# Patient Record
Sex: FEMALE | Race: WHITE | NOT HISPANIC OR LATINO | Employment: OTHER | URBAN - METROPOLITAN AREA
[De-identification: names, ages, dates, MRNs, and addresses within clinical notes are randomized per-mention and may not be internally consistent; named-entity substitution may affect disease eponyms.]

---

## 2017-03-13 RX ORDER — TIZANIDINE 4 MG/1
TABLET ORAL
Qty: 90 TABLET | Refills: 2 | Status: SHIPPED | OUTPATIENT
Start: 2017-03-13 | End: 2017-06-04 | Stop reason: SDUPTHER

## 2017-05-11 ENCOUNTER — PATIENT MESSAGE (OUTPATIENT)
Dept: INTERNAL MEDICINE | Facility: CLINIC | Age: 67
End: 2017-05-11

## 2017-05-11 RX ORDER — ESTERIFIED ESTROGEN AND METHYLTESTOSTERONE .625; 1.25 MG/1; MG/1
1 TABLET ORAL DAILY
Qty: 30 TABLET | Refills: 2 | Status: SHIPPED | OUTPATIENT
Start: 2017-05-11 | End: 2017-08-04 | Stop reason: SDUPTHER

## 2017-05-11 RX ORDER — CLONAZEPAM 2 MG/1
2 TABLET ORAL DAILY PRN
Qty: 30 TABLET | Refills: 2 | Status: SHIPPED | OUTPATIENT
Start: 2017-05-11 | End: 2017-08-04 | Stop reason: SDUPTHER

## 2017-05-11 RX ORDER — CLONAZEPAM 2 MG/1
TABLET ORAL
Qty: 30 TABLET | OUTPATIENT
Start: 2017-05-11

## 2017-05-11 RX ORDER — ESTERIFIED ESTROGEN AND METHYLTESTOSTERONE .625; 1.25 MG/1; MG/1
TABLET ORAL
Qty: 30 TABLET | OUTPATIENT
Start: 2017-05-11

## 2017-05-11 NOTE — TELEPHONE ENCOUNTER
Called 3 refills into recorder at [facility].  Patient advised by email.  That will get her to next appt due.    Please sign to chart.    Thanks macario

## 2017-06-04 RX ORDER — TIZANIDINE 4 MG/1
TABLET ORAL
Qty: 90 TABLET | Refills: 2 | Status: SHIPPED | OUTPATIENT
Start: 2017-06-04 | End: 2017-08-27 | Stop reason: SDUPTHER

## 2017-06-26 RX ORDER — AMLODIPINE BESYLATE 5 MG/1
TABLET ORAL
Qty: 30 TABLET | Refills: 8 | Status: SHIPPED | OUTPATIENT
Start: 2017-06-26 | End: 2018-03-02 | Stop reason: SDUPTHER

## 2017-06-26 RX ORDER — LISINOPRIL AND HYDROCHLOROTHIAZIDE 12.5; 2 MG/1; MG/1
TABLET ORAL
Qty: 30 TABLET | Refills: 8 | Status: SHIPPED | OUTPATIENT
Start: 2017-06-26 | End: 2018-01-14 | Stop reason: SDUPTHER

## 2017-07-11 ENCOUNTER — OFFICE VISIT (OUTPATIENT)
Dept: INTERNAL MEDICINE | Facility: CLINIC | Age: 67
End: 2017-07-11
Payer: COMMERCIAL

## 2017-07-11 ENCOUNTER — LAB VISIT (OUTPATIENT)
Dept: LAB | Facility: HOSPITAL | Age: 67
End: 2017-07-11
Attending: INTERNAL MEDICINE
Payer: COMMERCIAL

## 2017-07-11 VITALS
RESPIRATION RATE: 16 BRPM | WEIGHT: 167.56 LBS | HEART RATE: 68 BPM | SYSTOLIC BLOOD PRESSURE: 120 MMHG | HEIGHT: 64 IN | DIASTOLIC BLOOD PRESSURE: 72 MMHG | TEMPERATURE: 98 F | BODY MASS INDEX: 28.6 KG/M2

## 2017-07-11 DIAGNOSIS — G47.00 INSOMNIA, UNSPECIFIED TYPE: ICD-10-CM

## 2017-07-11 DIAGNOSIS — Z12.39 SCREENING FOR MALIGNANT NEOPLASM OF BREAST: ICD-10-CM

## 2017-07-11 DIAGNOSIS — I70.0 AORTIC ATHEROSCLEROSIS: ICD-10-CM

## 2017-07-11 DIAGNOSIS — Z00.00 ANNUAL PHYSICAL EXAM: Primary | ICD-10-CM

## 2017-07-11 DIAGNOSIS — I77.9 CAROTID DISEASE, BILATERAL: ICD-10-CM

## 2017-07-11 DIAGNOSIS — E78.2 MIXED HYPERLIPIDEMIA: ICD-10-CM

## 2017-07-11 DIAGNOSIS — Z00.00 ANNUAL PHYSICAL EXAM: ICD-10-CM

## 2017-07-11 DIAGNOSIS — I10 ESSENTIAL HYPERTENSION: ICD-10-CM

## 2017-07-11 DIAGNOSIS — R07.2 PRECORDIAL PAIN: ICD-10-CM

## 2017-07-11 LAB
ALBUMIN SERPL BCP-MCNC: 4.1 G/DL
ALP SERPL-CCNC: 39 U/L
ALT SERPL W/O P-5'-P-CCNC: 25 U/L
ANION GAP SERPL CALC-SCNC: 12 MMOL/L
AST SERPL-CCNC: 23 U/L
BASOPHILS # BLD AUTO: 0.06 K/UL
BASOPHILS NFR BLD: 0.8 %
BILIRUB SERPL-MCNC: 0.6 MG/DL
BUN SERPL-MCNC: 22 MG/DL
CALCIUM SERPL-MCNC: 9.6 MG/DL
CHLORIDE SERPL-SCNC: 97 MMOL/L
CHOLEST/HDLC SERPL: 2.4 {RATIO}
CO2 SERPL-SCNC: 27 MMOL/L
CREAT SERPL-MCNC: 0.8 MG/DL
DIFFERENTIAL METHOD: ABNORMAL
EOSINOPHIL # BLD AUTO: 0.1 K/UL
EOSINOPHIL NFR BLD: 1.5 %
ERYTHROCYTE [DISTWIDTH] IN BLOOD BY AUTOMATED COUNT: 14.9 %
EST. GFR  (AFRICAN AMERICAN): >60 ML/MIN/1.73 M^2
EST. GFR  (NON AFRICAN AMERICAN): >60 ML/MIN/1.73 M^2
GLUCOSE SERPL-MCNC: 82 MG/DL
HCT VFR BLD AUTO: 44.1 %
HDL/CHOLESTEROL RATIO: 42.2 %
HDLC SERPL-MCNC: 230 MG/DL
HDLC SERPL-MCNC: 97 MG/DL
HGB BLD-MCNC: 15.1 G/DL
LDLC SERPL CALC-MCNC: 125.2 MG/DL
LYMPHOCYTES # BLD AUTO: 2.2 K/UL
LYMPHOCYTES NFR BLD: 30.8 %
MCH RBC QN AUTO: 30.7 PG
MCHC RBC AUTO-ENTMCNC: 34.2 %
MCV RBC AUTO: 90 FL
MONOCYTES # BLD AUTO: 0.6 K/UL
MONOCYTES NFR BLD: 8.3 %
NEUTROPHILS # BLD AUTO: 4.2 K/UL
NEUTROPHILS NFR BLD: 58.3 %
NONHDLC SERPL-MCNC: 133 MG/DL
PLATELET # BLD AUTO: 307 K/UL
PMV BLD AUTO: 11.3 FL
POTASSIUM SERPL-SCNC: 3.8 MMOL/L
PROT SERPL-MCNC: 7.5 G/DL
RBC # BLD AUTO: 4.92 M/UL
SODIUM SERPL-SCNC: 136 MMOL/L
T4 FREE SERPL-MCNC: 1.14 NG/DL
TRIGL SERPL-MCNC: 39 MG/DL
TSH SERPL DL<=0.005 MIU/L-ACNC: 1.21 UIU/ML
WBC # BLD AUTO: 7.14 K/UL

## 2017-07-11 PROCEDURE — 84439 ASSAY OF FREE THYROXINE: CPT

## 2017-07-11 PROCEDURE — 99999 PR PBB SHADOW E&M-EST. PATIENT-LVL IV: CPT | Mod: PBBFAC,,, | Performed by: INTERNAL MEDICINE

## 2017-07-11 PROCEDURE — 80053 COMPREHEN METABOLIC PANEL: CPT

## 2017-07-11 PROCEDURE — 36415 COLL VENOUS BLD VENIPUNCTURE: CPT | Mod: PO

## 2017-07-11 PROCEDURE — 99396 PREV VISIT EST AGE 40-64: CPT | Mod: S$GLB,,, | Performed by: INTERNAL MEDICINE

## 2017-07-11 PROCEDURE — 84443 ASSAY THYROID STIM HORMONE: CPT

## 2017-07-11 PROCEDURE — 80061 LIPID PANEL: CPT

## 2017-07-11 PROCEDURE — 85025 COMPLETE CBC W/AUTO DIFF WBC: CPT

## 2017-07-11 RX ORDER — ALBUTEROL SULFATE 90 UG/1
1 AEROSOL, METERED RESPIRATORY (INHALATION) 2 TIMES DAILY PRN
Refills: 5 | COMMUNITY
Start: 2017-04-25

## 2017-07-12 ENCOUNTER — TELEPHONE (OUTPATIENT)
Dept: INTERNAL MEDICINE | Facility: CLINIC | Age: 67
End: 2017-07-12

## 2017-07-13 ENCOUNTER — HOSPITAL ENCOUNTER (OUTPATIENT)
Dept: RADIOLOGY | Facility: HOSPITAL | Age: 67
Discharge: HOME | End: 2017-07-13
Attending: INTERNAL MEDICINE
Payer: COMMERCIAL

## 2017-07-13 DIAGNOSIS — I77.9 CAROTID DISEASE, BILATERAL: ICD-10-CM

## 2017-07-13 DIAGNOSIS — I70.0 AORTIC ATHEROSCLEROSIS: ICD-10-CM

## 2017-07-13 PROCEDURE — 75571 CT HRT W/O DYE W/CA TEST: CPT | Mod: TC

## 2017-07-13 PROCEDURE — 75571 CT HRT W/O DYE W/CA TEST: CPT | Mod: 26,,, | Performed by: RADIOLOGY

## 2017-07-13 NOTE — TELEPHONE ENCOUNTER
She has wbc in urine again but cultures we have done in past have been neg.  The rest is normal except cholesterol is slightly high, no change in treatment

## 2017-07-16 NOTE — PROGRESS NOTES
Subjective:       Patient ID: Cornelia Orlando is a 66 y.o. female.    Chief Complaint: Annual Exam (fasting for lab today.)  HISTORY OF PRESENT ILLNESS:  A 66-year-old white female coming in for an annual   review and had lab work done prior to this visit, which was normal.  She sees   Dr. Warren for colonoscopy and is apparently due her colonoscopy.  The last time   she had it was she thinks in the 14 year ago time.  She also sees Dr. Nazario for lung check, though not aware of any disease.  She had an episode   one month ago of chest pain that really began in her jaw, lasted about 30   minutes, unassociated with activity and she does physical activity regularly   including dancing.  The patient has no other complaints.    PHYSICAL EXAMINATION:  VITAL SIGNS:  Normal.  SKIN:  Fair and healthy.  HEENT:  Clear.  NECK:  Shows no adenopathy, thyroid enlargement or bruit.  Teeth look fine.  She   has no jaw tenderness.  No adenopathy.  CHEST:  Clear.  HEART:  There is no murmur or gallop.  BREASTS:  Examined.  She has no mass to palpation.  Mammogram was ordered.  ABDOMEN:  Nontender without any organomegaly, bruit, mass, fullness.  Bowel   sounds are active.  EXTREMITIES:  Show normal muscles, joints, pulses.  NEUROLOGIC:  Normal.    IMPRESSION:  1.  Chest pain.  I am ordering a calcium score on her as well as carotid study   because she has a history of calcified aorta and bilateral mild carotid disease.  2.  Hypertension, controlled.  3.  Hyperlipidemia, on medication.  4.  Sleep disorder, controlled with medication.  5.  Bilateral mild carotid disease.  6.  Aortic atherosclerosis.  7.  Chest pain.  I did an EKG on her, but I do not see any result.  I will see   her again in one year.      JVALDEZ/HN  dd: 07/16/2017 19:54:50 (CDT)  td: 07/17/2017 05:57:22 (CDT)  Doc ID   #[id number]  Job ID #[id number]    CC:     Dict [id number]  HPI  Review of Systems   Constitutional: Negative.    HENT: Negative for congestion, hearing loss,  sinus pressure, sneezing, sore throat and voice change.    Eyes: Negative for visual disturbance.   Respiratory: Negative for cough, chest tightness and shortness of breath.    Cardiovascular: Positive for chest pain. Negative for palpitations and leg swelling.   Gastrointestinal: Negative.    Genitourinary: Negative for difficulty urinating, dysuria, flank pain, frequency, hematuria, menstrual problem, pelvic pain, urgency, vaginal bleeding and vaginal discharge.   Musculoskeletal: Negative.  Negative for neck pain and neck stiffness.   Skin: Negative.    Neurological: Negative.  Negative for dizziness, seizures, light-headedness, numbness and headaches.   Psychiatric/Behavioral: Negative for agitation, behavioral problems, confusion and sleep disturbance. The patient is not nervous/anxious.        Objective:      Physical Exam   Constitutional: She is oriented to person, place, and time. She appears well-developed and well-nourished.   Eyes: EOM are normal. Pupils are equal, round, and reactive to light.   Neck: Normal range of motion. Neck supple. No JVD present. No thyromegaly present.   Cardiovascular: Normal rate, regular rhythm, normal heart sounds and intact distal pulses.  Exam reveals no gallop.    No murmur heard.  Pulmonary/Chest: Breath sounds normal. She has no wheezes. She has no rales.   Abdominal: Soft. Bowel sounds are normal. She exhibits no mass. There is no tenderness.   Musculoskeletal: Normal range of motion.   Lymphadenopathy:     She has no cervical adenopathy.   Neurological: She is alert and oriented to person, place, and time. She has normal reflexes. No cranial nerve deficit.   Skin: No rash noted. No erythema.   Psychiatric: She has a normal mood and affect. Judgment normal.       Assessment:       1. Annual physical exam    2. Essential hypertension    3. Mixed hyperlipidemia    4. Insomnia, unspecified type    5. Aortic atherosclerosis    6. Carotid disease, bilateral    7. Screening  for malignant neoplasm of breast    8. Precordial pain        Plan:

## 2017-08-01 ENCOUNTER — HOSPITAL ENCOUNTER (OUTPATIENT)
Dept: RADIOLOGY | Facility: HOSPITAL | Age: 67
Discharge: HOME | End: 2017-08-01
Attending: INTERNAL MEDICINE
Payer: COMMERCIAL

## 2017-08-01 DIAGNOSIS — I77.9 CAROTID DISEASE, BILATERAL: ICD-10-CM

## 2017-08-01 PROCEDURE — 93880 EXTRACRANIAL BILAT STUDY: CPT | Mod: TC

## 2017-08-01 PROCEDURE — 93880 EXTRACRANIAL BILAT STUDY: CPT | Mod: 26,,, | Performed by: RADIOLOGY

## 2017-08-04 RX ORDER — ESTERIFIED ESTROGEN AND METHYLTESTOSTERONE .625; 1.25 MG/1; MG/1
TABLET ORAL
Qty: 30 TABLET | Refills: 2 | Status: SHIPPED | OUTPATIENT
Start: 2017-08-04 | End: 2018-02-09 | Stop reason: SDUPTHER

## 2017-08-04 RX ORDER — CLONAZEPAM 2 MG/1
TABLET ORAL
Qty: 30 TABLET | Refills: 2 | Status: SHIPPED | OUTPATIENT
Start: 2017-08-04 | End: 2017-11-24 | Stop reason: SDUPTHER

## 2017-08-04 NOTE — TELEPHONE ENCOUNTER
Called estratest and clonazepam rx's to [facility] [phone] recorder as dr lau approved.

## 2017-08-11 ENCOUNTER — TELEPHONE (OUTPATIENT)
Dept: INTERNAL MEDICINE | Facility: CLINIC | Age: 67
End: 2017-08-11

## 2017-08-11 ENCOUNTER — PATIENT MESSAGE (OUTPATIENT)
Dept: INTERNAL MEDICINE | Facility: CLINIC | Age: 67
End: 2017-08-11

## 2017-08-11 NOTE — TELEPHONE ENCOUNTER
Patient complaining of fever and body aches.  Not sure if has had symptoms over 48 hours yet.  [facility]    I told her to push fluids and rest and I would see if you will rx antibiotic that she would only start if symptoms last over 48 hours.    rx ok?    Please advise.  Thanks macario

## 2017-08-13 RX ORDER — AZITHROMYCIN 250 MG/1
TABLET, FILM COATED ORAL
Qty: 6 TABLET | Refills: 0 | Status: SHIPPED | OUTPATIENT
Start: 2017-08-13

## 2017-08-26 RX ORDER — LEVOTHYROXINE SODIUM 50 UG/1
TABLET ORAL
Qty: 30 TABLET | Refills: 11 | Status: SHIPPED | OUTPATIENT
Start: 2017-08-26 | End: 2018-07-16 | Stop reason: SDUPTHER

## 2017-08-28 RX ORDER — TIZANIDINE 4 MG/1
TABLET ORAL
Qty: 90 TABLET | Refills: 2 | Status: SHIPPED | OUTPATIENT
Start: 2017-08-28 | End: 2017-11-14 | Stop reason: SDUPTHER

## 2017-09-05 ENCOUNTER — HOSPITAL ENCOUNTER (OUTPATIENT)
Dept: RADIOLOGY | Facility: HOSPITAL | Age: 67
Discharge: HOME | End: 2017-09-05
Attending: INTERNAL MEDICINE
Payer: COMMERCIAL

## 2017-09-05 DIAGNOSIS — Z12.31 ENCOUNTER FOR SCREENING MAMMOGRAM FOR BREAST CANCER: ICD-10-CM

## 2017-09-05 DIAGNOSIS — Z12.39 BREAST CANCER SCREENING: ICD-10-CM

## 2017-09-05 DIAGNOSIS — Z12.39 SCREENING FOR MALIGNANT NEOPLASM OF BREAST: ICD-10-CM

## 2017-09-05 PROCEDURE — 77067 SCR MAMMO BI INCL CAD: CPT | Mod: TC

## 2017-09-05 PROCEDURE — 77063 BREAST TOMOSYNTHESIS BI: CPT | Mod: 26,,, | Performed by: RADIOLOGY

## 2017-09-05 PROCEDURE — 77067 SCR MAMMO BI INCL CAD: CPT | Mod: 26,,, | Performed by: RADIOLOGY

## 2017-09-10 RX ORDER — CITALOPRAM 20 MG/1
TABLET, FILM COATED ORAL
Qty: 90 TABLET | Refills: 3 | Status: SHIPPED | OUTPATIENT
Start: 2017-09-10 | End: 2018-07-16 | Stop reason: SDUPTHER

## 2017-11-14 RX ORDER — TIZANIDINE 4 MG/1
TABLET ORAL
Qty: 90 TABLET | Refills: 2 | Status: SHIPPED | OUTPATIENT
Start: 2017-11-14 | End: 2018-07-16 | Stop reason: SDUPTHER

## 2017-11-20 RX ORDER — TIZANIDINE 4 MG/1
TABLET ORAL
Qty: 90 TABLET | Refills: 2 | Status: SHIPPED | OUTPATIENT
Start: 2017-11-20 | End: 2018-02-09 | Stop reason: SDUPTHER

## 2017-11-24 RX ORDER — CLONAZEPAM 2 MG/1
TABLET ORAL
Qty: 30 TABLET | Refills: 1 | Status: SHIPPED | OUTPATIENT
Start: 2017-11-24 | End: 2018-01-24 | Stop reason: SDUPTHER

## 2017-11-24 NOTE — TELEPHONE ENCOUNTER
Called clonazepam 2mg 30 x 1 refill to recorder  [id number] - [place], [place] - [address]   [address] [place] [address]   Phone: [phone]      As dr lau approved.

## 2018-01-14 RX ORDER — LISINOPRIL AND HYDROCHLOROTHIAZIDE 12.5; 2 MG/1; MG/1
TABLET ORAL
Qty: 30 TABLET | Refills: 4 | Status: SHIPPED | OUTPATIENT
Start: 2018-01-14 | End: 2018-04-10 | Stop reason: SDUPTHER

## 2018-01-24 RX ORDER — CLONAZEPAM 2 MG/1
TABLET ORAL
Qty: 30 TABLET | Refills: 1 | Status: SHIPPED | OUTPATIENT
Start: 2018-01-24 | End: 2018-03-22 | Stop reason: SDUPTHER

## 2018-01-24 NOTE — TELEPHONE ENCOUNTER
Called clonazepam 2mg  #30 x 1 to recorder at   [facility]/pharmacy #[id number] - [place], [place] - [address]   [address] [place] [address]   Phone: [phone]     As dr lau approved.

## 2018-02-12 RX ORDER — ESTERIFIED ESTROGEN AND METHYLTESTOSTERONE .625; 1.25 MG/1; MG/1
TABLET ORAL
Qty: 30 TABLET | Refills: 2 | Status: SHIPPED | OUTPATIENT
Start: 2018-02-12 | End: 2018-07-16 | Stop reason: SDUPTHER

## 2018-02-12 RX ORDER — TIZANIDINE 4 MG/1
TABLET ORAL
Qty: 90 TABLET | Refills: 2 | Status: SHIPPED | OUTPATIENT
Start: 2018-02-12 | End: 2018-05-02 | Stop reason: SDUPTHER

## 2018-03-02 RX ORDER — AMLODIPINE BESYLATE 5 MG/1
TABLET ORAL
Qty: 30 TABLET | Refills: 7 | Status: SHIPPED | OUTPATIENT
Start: 2018-03-02 | End: 2018-07-16 | Stop reason: SDUPTHER

## 2018-03-13 ENCOUNTER — TELEPHONE (OUTPATIENT)
Dept: INTERNAL MEDICINE | Facility: CLINIC | Age: 68
End: 2018-03-13

## 2018-03-13 ENCOUNTER — PATIENT MESSAGE (OUTPATIENT)
Dept: INTERNAL MEDICINE | Facility: CLINIC | Age: 68
End: 2018-03-13

## 2018-03-13 DIAGNOSIS — Z00.00 ANNUAL PHYSICAL EXAM: Primary | ICD-10-CM

## 2018-03-14 ENCOUNTER — PATIENT MESSAGE (OUTPATIENT)
Dept: INTERNAL MEDICINE | Facility: CLINIC | Age: 68
End: 2018-03-14

## 2018-03-23 RX ORDER — CLONAZEPAM 2 MG/1
TABLET ORAL
Qty: 30 TABLET | Refills: 1 | Status: SHIPPED | OUTPATIENT
Start: 2018-03-23 | End: 2018-06-20 | Stop reason: SDUPTHER

## 2018-03-26 ENCOUNTER — PATIENT MESSAGE (OUTPATIENT)
Dept: INTERNAL MEDICINE | Facility: CLINIC | Age: 68
End: 2018-03-26

## 2018-03-26 RX ORDER — CLONAZEPAM 2 MG/1
TABLET ORAL
Qty: 30 TABLET | Refills: 1 | OUTPATIENT
Start: 2018-03-26

## 2018-04-12 RX ORDER — LISINOPRIL AND HYDROCHLOROTHIAZIDE 12.5; 2 MG/1; MG/1
1 TABLET ORAL DAILY
Qty: 90 TABLET | Refills: 1 | Status: SHIPPED | OUTPATIENT
Start: 2018-04-12 | End: 2018-07-16 | Stop reason: SDUPTHER

## 2018-05-03 RX ORDER — TIZANIDINE 4 MG/1
TABLET ORAL
Qty: 90 TABLET | Refills: 2 | Status: SHIPPED | OUTPATIENT
Start: 2018-05-03 | End: 2018-06-21 | Stop reason: SDUPTHER

## 2018-06-21 RX ORDER — TIZANIDINE 4 MG/1
TABLET ORAL
Qty: 90 TABLET | Refills: 0 | Status: SHIPPED | OUTPATIENT
Start: 2018-06-21 | End: 2018-07-16 | Stop reason: SDUPTHER

## 2018-06-21 RX ORDER — CLONAZEPAM 2 MG/1
TABLET ORAL
Qty: 30 TABLET | Refills: 2 | Status: SHIPPED | OUTPATIENT
Start: 2018-06-21 | End: 2018-06-23 | Stop reason: SDUPTHER

## 2018-06-21 NOTE — TELEPHONE ENCOUNTER
Clonazepam 2mg #30 x 2 refills called to recorder at   [facility]/pharmacy #[id number] - [place], [place] - [address]   [address] [place] [address]   Phone: [phone]      As dr lau approved.

## 2018-06-25 RX ORDER — CLONAZEPAM 2 MG/1
TABLET ORAL
Qty: 30 TABLET | Refills: 2 | Status: SHIPPED | OUTPATIENT
Start: 2018-06-25 | End: 2018-07-16 | Stop reason: SDUPTHER

## 2018-06-25 NOTE — TELEPHONE ENCOUNTER
Called in clonazepam #30 x 2 refills to recorder at   [facility]/pharmacy #[id number] - [place] - [address]   [address] [place] [address]   Phone: [phone]      As dr lau approved.

## 2018-06-28 ENCOUNTER — LAB VISIT (OUTPATIENT)
Dept: LAB | Facility: HOSPITAL | Age: 68
End: 2018-06-28
Attending: INTERNAL MEDICINE
Payer: MEDICARE

## 2018-06-28 DIAGNOSIS — Z00.00 ANNUAL PHYSICAL EXAM: ICD-10-CM

## 2018-06-28 LAB
ALBUMIN SERPL BCP-MCNC: 3.6 G/DL
ALP SERPL-CCNC: 29 U/L
ALT SERPL W/O P-5'-P-CCNC: 31 U/L
ANION GAP SERPL CALC-SCNC: 7 MMOL/L
AST SERPL-CCNC: 29 U/L
BASOPHILS # BLD AUTO: 0.06 K/UL
BASOPHILS NFR BLD: 0.9 %
BILIRUB SERPL-MCNC: 0.4 MG/DL
BILIRUB UR QL STRIP: NEGATIVE
BUN SERPL-MCNC: 20 MG/DL
CALCIUM SERPL-MCNC: 9.5 MG/DL
CHLORIDE SERPL-SCNC: 105 MMOL/L
CHOLEST SERPL-MCNC: 216 MG/DL
CHOLEST/HDLC SERPL: 3.1 {RATIO}
CLARITY UR REFRACT.AUTO: ABNORMAL
CO2 SERPL-SCNC: 27 MMOL/L
COLOR UR AUTO: YELLOW
CREAT SERPL-MCNC: 0.7 MG/DL
DIFFERENTIAL METHOD: ABNORMAL
EOSINOPHIL # BLD AUTO: 0.1 K/UL
EOSINOPHIL NFR BLD: 1.7 %
ERYTHROCYTE [DISTWIDTH] IN BLOOD BY AUTOMATED COUNT: 15.5 %
EST. GFR  (AFRICAN AMERICAN): >60 ML/MIN/1.73 M^2
EST. GFR  (NON AFRICAN AMERICAN): >60 ML/MIN/1.73 M^2
GLUCOSE SERPL-MCNC: 95 MG/DL
GLUCOSE UR QL STRIP: NEGATIVE
HCT VFR BLD AUTO: 42.3 %
HDLC SERPL-MCNC: 69 MG/DL
HDLC SERPL: 31.9 %
HGB BLD-MCNC: 13.9 G/DL
HGB UR QL STRIP: NEGATIVE
IMM GRANULOCYTES # BLD AUTO: 0.01 K/UL
IMM GRANULOCYTES NFR BLD AUTO: 0.2 %
KETONES UR QL STRIP: ABNORMAL
LDLC SERPL CALC-MCNC: 137 MG/DL
LEUKOCYTE ESTERASE UR QL STRIP: ABNORMAL
LYMPHOCYTES # BLD AUTO: 2.2 K/UL
LYMPHOCYTES NFR BLD: 33.1 %
MCH RBC QN AUTO: 29.9 PG
MCHC RBC AUTO-ENTMCNC: 32.9 G/DL
MCV RBC AUTO: 91 FL
MICROSCOPIC COMMENT: ABNORMAL
MONOCYTES # BLD AUTO: 0.4 K/UL
MONOCYTES NFR BLD: 6.2 %
NEUTROPHILS # BLD AUTO: 3.9 K/UL
NEUTROPHILS NFR BLD: 57.9 %
NITRITE UR QL STRIP: NEGATIVE
NON-SQ EPI CELLS #/AREA URNS AUTO: 1 /HPF
NONHDLC SERPL-MCNC: 147 MG/DL
NRBC BLD-RTO: 0 /100 WBC
PH UR STRIP: 5 [PH] (ref 5–8)
PLATELET # BLD AUTO: 286 K/UL
PMV BLD AUTO: 11.8 FL
POTASSIUM SERPL-SCNC: 3.6 MMOL/L
PROT SERPL-MCNC: 6.4 G/DL
PROT UR QL STRIP: NEGATIVE
RBC # BLD AUTO: 4.65 M/UL
RBC #/AREA URNS AUTO: 0 /HPF (ref 0–4)
SODIUM SERPL-SCNC: 139 MMOL/L
SP GR UR STRIP: 1.02 (ref 1–1.03)
SQUAMOUS #/AREA URNS AUTO: 3 /HPF
T4 FREE SERPL-MCNC: 0.92 NG/DL
TRIGL SERPL-MCNC: 50 MG/DL
TSH SERPL DL<=0.005 MIU/L-ACNC: 1.98 UIU/ML
URN SPEC COLLECT METH UR: ABNORMAL
UROBILINOGEN UR STRIP-ACNC: NEGATIVE EU/DL
WBC # BLD AUTO: 6.64 K/UL
WBC #/AREA URNS AUTO: 3 /HPF (ref 0–5)

## 2018-06-28 PROCEDURE — 80053 COMPREHEN METABOLIC PANEL: CPT

## 2018-06-28 PROCEDURE — 84439 ASSAY OF FREE THYROXINE: CPT

## 2018-06-28 PROCEDURE — 81001 URINALYSIS AUTO W/SCOPE: CPT

## 2018-06-28 PROCEDURE — 36415 COLL VENOUS BLD VENIPUNCTURE: CPT | Mod: PO

## 2018-06-28 PROCEDURE — 80061 LIPID PANEL: CPT

## 2018-06-28 PROCEDURE — 85025 COMPLETE CBC W/AUTO DIFF WBC: CPT

## 2018-06-28 PROCEDURE — 84443 ASSAY THYROID STIM HORMONE: CPT

## 2018-07-16 ENCOUNTER — OFFICE VISIT (OUTPATIENT)
Dept: INTERNAL MEDICINE | Facility: CLINIC | Age: 68
End: 2018-07-16
Payer: MEDICARE

## 2018-07-16 VITALS
SYSTOLIC BLOOD PRESSURE: 118 MMHG | DIASTOLIC BLOOD PRESSURE: 68 MMHG | HEART RATE: 102 BPM | BODY MASS INDEX: 26.88 KG/M2 | HEIGHT: 64 IN | WEIGHT: 157.44 LBS | TEMPERATURE: 98 F | RESPIRATION RATE: 18 BRPM

## 2018-07-16 DIAGNOSIS — G47.00 INSOMNIA, UNSPECIFIED TYPE: ICD-10-CM

## 2018-07-16 DIAGNOSIS — I10 ESSENTIAL HYPERTENSION: ICD-10-CM

## 2018-07-16 DIAGNOSIS — E78.2 MIXED HYPERLIPIDEMIA: ICD-10-CM

## 2018-07-16 DIAGNOSIS — Z12.39 BREAST CANCER SCREENING: ICD-10-CM

## 2018-07-16 DIAGNOSIS — Z00.00 ANNUAL PHYSICAL EXAM: Primary | ICD-10-CM

## 2018-07-16 DIAGNOSIS — I70.0 AORTIC ATHEROSCLEROSIS: ICD-10-CM

## 2018-07-16 PROCEDURE — 99397 PER PM REEVAL EST PAT 65+ YR: CPT | Mod: S$PBB,,, | Performed by: INTERNAL MEDICINE

## 2018-07-16 PROCEDURE — 99999 PR PBB SHADOW E&M-EST. PATIENT-LVL III: CPT | Mod: PBBFAC,,, | Performed by: INTERNAL MEDICINE

## 2018-07-16 PROCEDURE — 99213 OFFICE O/P EST LOW 20 MIN: CPT | Mod: PBBFAC,PO | Performed by: INTERNAL MEDICINE

## 2018-07-16 RX ORDER — CITALOPRAM 20 MG/1
20 TABLET, FILM COATED ORAL DAILY
Qty: 90 TABLET | Refills: 3 | Status: SHIPPED | OUTPATIENT
Start: 2018-07-16 | End: 2019-09-24 | Stop reason: SDUPTHER

## 2018-07-16 RX ORDER — TIZANIDINE 4 MG/1
4 TABLET ORAL 3 TIMES DAILY
Qty: 90 TABLET | Refills: 3 | Status: SHIPPED | OUTPATIENT
Start: 2018-07-16 | End: 2018-10-19 | Stop reason: SDUPTHER

## 2018-07-16 RX ORDER — ALPRAZOLAM 0.25 MG/1
0.25 TABLET ORAL EVERY OTHER DAY
Qty: 15 TABLET | Refills: 0 | Status: SHIPPED | OUTPATIENT
Start: 2018-07-16

## 2018-07-16 RX ORDER — IPRATROPIUM BROMIDE 42 UG/1
SPRAY, METERED NASAL
COMMUNITY
Start: 2018-04-15

## 2018-07-16 RX ORDER — ESTERIFIED ESTROGEN AND METHYLTESTOSTERONE .625; 1.25 MG/1; MG/1
1 TABLET ORAL DAILY
Qty: 30 TABLET | Refills: 2 | Status: SHIPPED | OUTPATIENT
Start: 2018-07-16 | End: 2018-08-24 | Stop reason: SDUPTHER

## 2018-07-16 RX ORDER — LEVOTHYROXINE SODIUM 50 UG/1
50 TABLET ORAL DAILY
Qty: 90 TABLET | Refills: 3 | Status: SHIPPED | OUTPATIENT
Start: 2018-07-16 | End: 2019-07-24 | Stop reason: SDUPTHER

## 2018-07-16 RX ORDER — MONTELUKAST SODIUM 10 MG/1
10 TABLET ORAL NIGHTLY
Qty: 90 TABLET | Refills: 3 | Status: SHIPPED | OUTPATIENT
Start: 2018-07-16

## 2018-07-16 RX ORDER — LISINOPRIL AND HYDROCHLOROTHIAZIDE 12.5; 2 MG/1; MG/1
1 TABLET ORAL DAILY
Qty: 90 TABLET | Refills: 3 | Status: SHIPPED | OUTPATIENT
Start: 2018-07-16 | End: 2019-08-21 | Stop reason: ALTCHOICE

## 2018-07-16 RX ORDER — AMLODIPINE BESYLATE 5 MG/1
5 TABLET ORAL DAILY
Qty: 90 TABLET | Refills: 3 | Status: SHIPPED | OUTPATIENT
Start: 2018-07-16 | End: 2019-09-24 | Stop reason: SDUPTHER

## 2018-07-16 RX ORDER — CLONAZEPAM 2 MG/1
2 TABLET ORAL DAILY PRN
Qty: 90 TABLET | Refills: 1 | Status: SHIPPED | OUTPATIENT
Start: 2018-07-16 | End: 2018-07-21 | Stop reason: SDUPTHER

## 2018-07-16 NOTE — PROGRESS NOTES
Subjective:       Patient ID: Cornelia Orlando is a 67 y.o. female.    Chief Complaint: Annual Exam  HISTORY OF PRESENT ILLNESS:  A 67-year-old white female patient who is coming in   for an annual exam and is feeling well.  Her  recently transferred his   care to a doctor it turns out who does [facility] [place] since he   was seeing other doctors here on a regular basis with me being off as part-time   doctor.  He was not happy with the change, but also not happy to see other   doctors.  The patient's  was offered the ability to come back.  The   patient has no interest in changing doctors.  She had lab work done prior to   this showing slightly reactive urine.  She has had urine cultures done before   that were negative.  Thyroid function normal.  CBC normal.  Lipids show   cholesterol 216.  Chemistries normal.    PHYSICAL EXAMINATION:  VITAL SIGNS:  Normal.  SKIN:  Fair, healthy.  HEENT:  Clear.  NECK:  Shows no adenopathy, thyroid enlargement or bruit.  CHEST:  Clear.  HEART:  There is no murmur or gallop.  BREASTS:  Examined.  She has no mass to palpation.  Mammogram was ordered.  ABDOMEN:  Nontender without any organomegaly.  EXTREMITIES:  Show normal muscles, joints, pulses.  NEUROLOGIC:  Normal.    IMPRESSION:  1.  History of hypertension, controlled.  2.  Mild elevation of cholesterol with negative carotid and cardiac CT.  3.  Sleep disorder, doing well on medications.  4.  Aortic atherosclerosis.  5.  Normal breast exam.  I will see her again in one year.      ORLIN/ERENDIRA  dd: 07/28/2018 17:44:38 (CDT)  td: 07/28/2018 21:04:22 (CDT)  Doc ID   #[id number]  Job ID #[id number]    CC:     Dict [id number]  HPI  Review of Systems   Constitutional: Negative for activity change and unexpected weight change.   HENT: Negative for hearing loss, rhinorrhea and trouble swallowing.    Eyes: Negative for discharge and visual disturbance.   Respiratory: Negative for chest tightness and wheezing.    Cardiovascular:  Negative for chest pain and palpitations.   Gastrointestinal: Negative for blood in stool, constipation, diarrhea and vomiting.   Endocrine: Negative for polydipsia and polyuria.   Genitourinary: Negative for difficulty urinating, dysuria, hematuria and menstrual problem.   Musculoskeletal: Negative for arthralgias, joint swelling and neck pain.   Neurological: Negative for weakness and headaches.   Psychiatric/Behavioral: Negative for confusion and dysphoric mood.       Objective:      Physical Exam   Constitutional: She is oriented to person, place, and time. She appears well-developed and well-nourished.   Eyes: EOM are normal. Pupils are equal, round, and reactive to light.   Neck: Normal range of motion. Neck supple. No JVD present. No thyromegaly present.   Cardiovascular: Normal rate, regular rhythm, normal heart sounds and intact distal pulses.  Exam reveals no gallop.    No murmur heard.  Pulmonary/Chest: Breath sounds normal. She has no wheezes. She has no rales.   Abdominal: Soft. Bowel sounds are normal. She exhibits no mass. There is no tenderness.   Musculoskeletal: Normal range of motion.   Lymphadenopathy:     She has no cervical adenopathy.   Neurological: She is alert and oriented to person, place, and time. She has normal reflexes. No cranial nerve deficit.   Skin: No rash noted. No erythema.   Psychiatric: She has a normal mood and affect. Judgment normal.       Assessment:       1. Annual physical exam    2. Essential hypertension    3. Mixed hyperlipidemia    4. Insomnia, unspecified type    5. Aortic atherosclerosis        Plan:

## 2018-07-22 RX ORDER — CLONAZEPAM 2 MG/1
TABLET ORAL
Qty: 30 TABLET | Refills: 2 | Status: SHIPPED | OUTPATIENT
Start: 2018-07-22 | End: 2019-01-26 | Stop reason: SDUPTHER

## 2018-07-23 NOTE — TELEPHONE ENCOUNTER
Clonazepam #30 x 2 refills called to recorder at   [facility]/pharmacy #[id number] - [place] - [address]   [address] [place] [address]   Phone: [phone]      as dr lau approved.

## 2018-08-25 RX ORDER — ESTERIFIED ESTROGEN AND METHYLTESTOSTERONE .625; 1.25 MG/1; MG/1
TABLET ORAL
Qty: 30 TABLET | Refills: 5 | Status: SHIPPED | OUTPATIENT
Start: 2018-08-25 | End: 2019-02-24 | Stop reason: SDUPTHER

## 2018-08-27 NOTE — TELEPHONE ENCOUNTER
Called in estratest .625-1.25 #30 x 5 refills to [facility] [phone] recorder as dr lau approved.

## 2018-09-06 ENCOUNTER — HOSPITAL ENCOUNTER (OUTPATIENT)
Dept: RADIOLOGY | Facility: HOSPITAL | Age: 68
Discharge: HOME | End: 2018-09-06
Attending: INTERNAL MEDICINE
Payer: MEDICARE

## 2018-09-06 DIAGNOSIS — Z12.39 BREAST CANCER SCREENING: ICD-10-CM

## 2018-09-06 PROCEDURE — 77063 BREAST TOMOSYNTHESIS BI: CPT | Mod: 26,,, | Performed by: RADIOLOGY

## 2018-09-06 PROCEDURE — 77067 SCR MAMMO BI INCL CAD: CPT | Mod: 26,,, | Performed by: RADIOLOGY

## 2018-09-06 PROCEDURE — 77067 SCR MAMMO BI INCL CAD: CPT | Mod: TC,PO

## 2018-10-21 RX ORDER — TIZANIDINE 4 MG/1
4 TABLET ORAL 3 TIMES DAILY
Qty: 90 TABLET | Refills: 0 | Status: SHIPPED | OUTPATIENT
Start: 2018-10-21 | End: 2018-11-18 | Stop reason: SDUPTHER

## 2018-11-19 RX ORDER — TIZANIDINE 4 MG/1
4 TABLET ORAL 3 TIMES DAILY
Qty: 90 TABLET | Refills: 0 | Status: SHIPPED | OUTPATIENT
Start: 2018-11-19 | End: 2019-11-14

## 2018-11-20 RX ORDER — TIZANIDINE 4 MG/1
4 TABLET ORAL 3 TIMES DAILY
Qty: 90 TABLET | Refills: 0 | Status: SHIPPED | OUTPATIENT
Start: 2018-11-20 | End: 2019-02-07 | Stop reason: SDUPTHER

## 2019-01-27 RX ORDER — CLONAZEPAM 2 MG/1
TABLET ORAL
Qty: 30 TABLET | Refills: 1 | Status: SHIPPED | OUTPATIENT
Start: 2019-01-27 | End: 2019-08-04 | Stop reason: SDUPTHER

## 2019-01-28 NOTE — TELEPHONE ENCOUNTER
Called in elana #30 x5 to recorder. This will get her next appt.    [facility]/pharmacy #[id number] - [place] - [address]   [address] [place] [address]   Phone: [phone]

## 2019-02-07 RX ORDER — TIZANIDINE 4 MG/1
TABLET ORAL
Qty: 90 TABLET | Refills: 0 | Status: SHIPPED | OUTPATIENT
Start: 2019-02-07 | End: 2019-02-28 | Stop reason: SDUPTHER

## 2019-02-24 RX ORDER — ESTERIFIED ESTROGEN AND METHYLTESTOSTERONE .625; 1.25 MG/1; MG/1
TABLET ORAL
Qty: 30 TABLET | Refills: 3 | Status: SHIPPED | OUTPATIENT
Start: 2019-02-24

## 2019-03-01 RX ORDER — TIZANIDINE 4 MG/1
TABLET ORAL
Qty: 90 TABLET | Refills: 0 | Status: SHIPPED | OUTPATIENT
Start: 2019-03-01 | End: 2019-03-27 | Stop reason: SDUPTHER

## 2019-03-28 RX ORDER — TIZANIDINE 4 MG/1
TABLET ORAL
Qty: 90 TABLET | Refills: 0 | Status: SHIPPED | OUTPATIENT
Start: 2019-03-28 | End: 2019-04-28 | Stop reason: SDUPTHER

## 2019-04-28 RX ORDER — TIZANIDINE 4 MG/1
TABLET ORAL
Qty: 90 TABLET | Refills: 0 | Status: SHIPPED | OUTPATIENT
Start: 2019-04-28 | End: 2019-05-26 | Stop reason: SDUPTHER

## 2019-04-29 ENCOUNTER — PATIENT MESSAGE (OUTPATIENT)
Dept: INTERNAL MEDICINE | Facility: CLINIC | Age: 69
End: 2019-04-29

## 2019-04-29 RX ORDER — TIZANIDINE 4 MG/1
TABLET ORAL
Qty: 90 TABLET | Refills: 0 | OUTPATIENT
Start: 2019-04-29

## 2019-05-21 DIAGNOSIS — Z12.11 COLON CANCER SCREENING: ICD-10-CM

## 2019-05-27 RX ORDER — TIZANIDINE 4 MG/1
TABLET ORAL
Qty: 90 TABLET | Refills: 0 | Status: SHIPPED | OUTPATIENT
Start: 2019-05-27 | End: 2019-06-24 | Stop reason: SDUPTHER

## 2019-06-14 ENCOUNTER — LAB VISIT (OUTPATIENT)
Dept: LAB | Facility: HOSPITAL | Age: 69
End: 2019-06-14
Attending: INTERNAL MEDICINE
Payer: MEDICARE

## 2019-06-14 DIAGNOSIS — Z12.11 COLON CANCER SCREENING: ICD-10-CM

## 2019-06-14 PROCEDURE — 82274 ASSAY TEST FOR BLOOD FECAL: CPT

## 2019-06-21 ENCOUNTER — TELEPHONE (OUTPATIENT)
Dept: INTERNAL MEDICINE | Facility: CLINIC | Age: 69
End: 2019-06-21

## 2019-06-21 LAB — HEMOCCULT STL QL IA: NEGATIVE

## 2019-06-25 RX ORDER — TIZANIDINE 4 MG/1
TABLET ORAL
Qty: 90 TABLET | Refills: 0 | Status: SHIPPED | OUTPATIENT
Start: 2019-06-25 | End: 2019-07-26 | Stop reason: SDUPTHER

## 2019-07-24 RX ORDER — LEVOTHYROXINE SODIUM 50 UG/1
50 TABLET ORAL DAILY
Qty: 90 TABLET | Refills: 3 | Status: SHIPPED | OUTPATIENT
Start: 2019-07-24 | End: 2020-06-30

## 2019-07-29 RX ORDER — TIZANIDINE 4 MG/1
TABLET ORAL
Qty: 90 TABLET | Refills: 0 | Status: SHIPPED | OUTPATIENT
Start: 2019-07-29 | End: 2019-08-31 | Stop reason: SDUPTHER

## 2019-08-05 RX ORDER — CLONAZEPAM 2 MG/1
TABLET ORAL
Qty: 30 TABLET | Refills: 2 | Status: SHIPPED | OUTPATIENT
Start: 2019-08-05

## 2019-08-05 NOTE — TELEPHONE ENCOUNTER
Called in clonazepam #30 x 2 to recorder at [facility] as dr lau approved.    [facility]/pharmacy #[id number] - [place], [place] - [address]   [address] [place] [address]   Phone: [phone]

## 2019-08-21 RX ORDER — VALSARTAN AND HYDROCHLOROTHIAZIDE 160; 12.5 MG/1; MG/1
1 TABLET, FILM COATED ORAL DAILY
Qty: 90 TABLET | Refills: 3 | Status: SHIPPED | OUTPATIENT
Start: 2019-08-21 | End: 2020-08-20

## 2019-08-21 RX ORDER — LISINOPRIL AND HYDROCHLOROTHIAZIDE 12.5; 2 MG/1; MG/1
TABLET ORAL
Qty: 90 TABLET | Refills: 3 | OUTPATIENT
Start: 2019-08-21

## 2019-08-21 NOTE — TELEPHONE ENCOUNTER
I made the switch on her HBP meds and need to see her soon, and have her monitor bp and bring in readings

## 2019-08-31 RX ORDER — TIZANIDINE 4 MG/1
TABLET ORAL
Qty: 90 TABLET | Refills: 0 | Status: SHIPPED | OUTPATIENT
Start: 2019-08-31 | End: 2019-09-28 | Stop reason: SDUPTHER

## 2019-09-24 RX ORDER — AMLODIPINE BESYLATE 5 MG/1
TABLET ORAL
Qty: 90 TABLET | Refills: 3 | Status: SHIPPED | OUTPATIENT
Start: 2019-09-24

## 2019-09-24 RX ORDER — CITALOPRAM 20 MG/1
TABLET, FILM COATED ORAL
Qty: 90 TABLET | Refills: 3 | Status: SHIPPED | OUTPATIENT
Start: 2019-09-24

## 2019-09-30 RX ORDER — TIZANIDINE 4 MG/1
TABLET ORAL
Qty: 90 TABLET | Refills: 0 | Status: SHIPPED | OUTPATIENT
Start: 2019-09-30 | End: 2019-10-23 | Stop reason: SDUPTHER

## 2019-10-16 ENCOUNTER — PATIENT OUTREACH (OUTPATIENT)
Dept: ADMINISTRATIVE | Facility: HOSPITAL | Age: 69
End: 2019-10-16

## 2019-10-24 RX ORDER — TIZANIDINE 4 MG/1
TABLET ORAL
Qty: 90 TABLET | Refills: 0 | Status: SHIPPED | OUTPATIENT
Start: 2019-10-24 | End: 2019-11-14

## 2019-11-14 RX ORDER — TIZANIDINE 4 MG/1
4 TABLET ORAL DAILY PRN
Qty: 90 TABLET | Refills: 1 | Status: SHIPPED | OUTPATIENT
Start: 2019-11-14

## 2019-11-14 NOTE — TELEPHONE ENCOUNTER
Gets this filled once a month.  Only uses qd prn.  I changed directions from tid because pharmacy wanted 90 day supply and 90 is 90 day supply.    I sent reminder to book her annual that was due July 2019.

## 2020-07-02 DIAGNOSIS — Z12.11 COLON CANCER SCREENING: ICD-10-CM

## 2020-10-01 ENCOUNTER — PATIENT MESSAGE (OUTPATIENT)
Dept: OTHER | Facility: OTHER | Age: 70
End: 2020-10-01

## 2020-12-11 ENCOUNTER — PATIENT MESSAGE (OUTPATIENT)
Dept: OTHER | Facility: OTHER | Age: 70
End: 2020-12-11

## 2021-01-04 ENCOUNTER — PATIENT MESSAGE (OUTPATIENT)
Dept: ADMINISTRATIVE | Facility: HOSPITAL | Age: 71
End: 2021-01-04

## 2021-04-05 ENCOUNTER — PATIENT MESSAGE (OUTPATIENT)
Dept: ADMINISTRATIVE | Facility: HOSPITAL | Age: 71
End: 2021-04-05

## 2021-07-06 ENCOUNTER — PATIENT MESSAGE (OUTPATIENT)
Dept: ADMINISTRATIVE | Facility: HOSPITAL | Age: 71
End: 2021-07-06

## 2021-12-03 NOTE — TELEPHONE ENCOUNTER
estratest 5 refills called to recorder at    [facility]/pharmacy #[id number] - [place], [place] - [address]   [address] [place] [address]   Phone: [phone]      To get her to next appt due in July.  
4 weeks

## 2024-09-18 DIAGNOSIS — Z82.49 FAMILY HISTORY OF HEART DISEASE: Primary | ICD-10-CM

## 2024-09-22 NOTE — PROGRESS NOTES
Cardiology Clinic Visit    History of Present Illness:       Cornelia Orlando is a pleasant 73 y.o. female with PMH of mesenteric artery stenosis, COPD, abnormal ECG, HLDHTN and family hx of CV dz noted below in assessment/plan presents here to establish care. Recently, her  was admitted to [place] for Vfib arrest s/p PCI of LAD. She is very concerned about her own health. She is a very active woman and have noticed occasional PAREDES which worries her and given all the circumstances she wants to rule out any type of CAD. - August 2023.     Call with results     recently had an MI/Vfib arrest Mr. Fulton    History obtained by patient interview and supplemented by nursing documentation and chart review.   PMHx:  has a past medical history of Asthma and Fibrocystic breast.   SurgHx:  has a past surgical history that includes Hysterectomy and Oophorectomy.   FamHx: family history includes Heart disease in her father.   SocialHx:  reports that she quit smoking about 19 years ago. Her smoking use included cigarettes. She has never used smokeless tobacco. She reports current alcohol use.  Home Meds:  Current Outpatient Medications   Medication Instructions    amLODIPine (NORVASC) 5 MG tablet TAKE 1 TABLET BY MOUTH EVERY DAY    clonazePAM (KLONOPIN) 2 MG Tab TAKE 1 TABLET BY MOUTH EVERY DAY    estrogens,conjugated,-methyltestosterone 0.625-1.25mg (ESTRATEST HS) 0.625-1.25 mg per tablet TAKE 1 TABLET BY MOUTH DAILY    ipratropium (ATROVENT) 42 mcg (0.06 %) nasal spray No dose, route, or frequency recorded.    levothyroxine (SYNTHROID) 50 MCG tablet TAKE 1 TABLET BY MOUTH EVERY DAY    lisinopriL (PRINIVIL,ZESTRIL) 20 mg, Oral, Daily    montelukast (SINGULAIR) 10 mg, Oral, Nightly, 1 Tablet Oral Every day    tiZANidine (ZANAFLEX) 4 mg, Oral, Daily PRN    VENTOLIN HFA 90 mcg/actuation inhaler 1 Inhaler, Inhalation, 2 times daily PRN       Review of Systems: Comprehensive ROS was performed and is negative unless  "otherwise noted in HPI.   Objective   Objective/Exam:   /74 (BP Location: Right arm)   Pulse 82   Ht 5' 4" (1.626 m)   Wt 67.6 kg (149 lb)   BMI 25.58 kg/m²    Wt Readings from Last 4 Encounters:   09/25/24 67.6 kg (149 lb)   07/16/18 71.4 kg (157 lb 6.5 oz)   07/11/17 76 kg (167 lb 8.8 oz)   07/18/16 78.5 kg (173 lb)      Constitutional: NAD, Atraumatic, Conversant   HEENT: MMM, Sclera anicteric, No JVD   Cardiovascular: RRR, no murmurs noted, no chest tenderness to palpation, 2+ radial pulses b/l  Pulmonary: normal respiratory effort, CTAB, no crackles, wheezes  Abdominal: S/NT/ND  Musculoskeletal: No lower extremity edema noted b/l  Neurological: No gross neurological deficits  Skin: W/D/I  Psych: Appropriate affect, normal mood  Labs/Imaging/Procedures   Personally reviewed  Lab Results   Component Value Date     06/28/2018    K 3.6 06/28/2018     06/28/2018    CO2 27 06/28/2018    BUN 20 06/28/2018    CREATININE 0.7 06/28/2018    ANIONGAP 7 (L) 06/28/2018     No results found for: "HGBA1C"  No results found for: "BNP", "BNPTRIAGEBLO"   Lab Results   Component Value Date    WBC 6.64 06/28/2018    HGB 13.9 06/28/2018    HCT 42.3 06/28/2018     06/28/2018    GRAN 3.9 06/28/2018    GRAN 57.9 06/28/2018     Lab Results   Component Value Date    CHOL 216 (H) 06/28/2018    HDL 69 06/28/2018    LDLCALC 137.0 06/28/2018    LDLCALC 125.2 07/11/2017    LDLCALC 136.8 07/18/2016    TRIG 50 06/28/2018     Lab Results   Component Value Date    TSH 1.980 06/28/2018     No results found for: "APOLIPOPROTE"  No results found for: "LIPOA"     TTE:  No results found for this or any previous visit.    Stress Testing:   No results found for this or any previous visit.     Coronary Angiogram:  No results found for this or any previous visit.    -Reviewing Medical records & lab results  -Independently reviewing and interpreting (if not documented by myself) EKGs, echocardiograms, catherizations "   -Obtaining a history, performing a relevant exam, counseling/educating the patient/family  -Documenting clinical information in the EMR including ordering of tests  -Care coordination and communicating with other health care providers       Problem List:     1. Nonspecific abnormal electrocardiogram (ECG) (EKG)    2. Family history of heart disease    3. Mesenteric artery stenosis    4. Hyperlipidemia, unspecified hyperlipidemia type    5. Abnormal finding of blood chemistry, unspecified    6. Chronic obstructive pulmonary disease, unspecified COPD type    7. Encounter for screening for cardiovascular disorders    8. Chest pain, unspecified type    9. Stricture of artery      Assessment/Plan:     Cornelia Orlanod is a pleasant 73 y.o. female with PMH of mesenteric artery stenosis, COPD, abnormal ECG, HLDHTN and family hx of CV dz noted below in assessment/plan presents here to establish care. had a Vfib arrest s/p PCI with a complex course. Given pmhx and fhx she is being proactive with her health. She has noticed occasional PAREDES which worries and given the circumstances she wants to screen for CV diseases.    Plan:  Echo  cCTA  Carotid US  Labs        Patient expressed verbal understanding and agreed with the plan     Return sooner for concerns or questions. If symptoms persist go to the ED.  I have reviewed all pertinent data including patient's medical history in detail and updated the computerized patient record.     Total time spent counseling greater than fifty percent of total visit time.  Counseling included discussion regarding imaging findings, diagnosis, possibilities, treatment options, risks and benefits.      Thank you for the opportunity to care for this patient. Please don't hesitate to reach out with any questions/concerns        Dustin Larios MD  Cardiovascular Disease; Interventional Cardiology  [facility]

## 2024-09-25 ENCOUNTER — OFFICE VISIT (OUTPATIENT)
Dept: CARDIOLOGY | Facility: CLINIC | Age: 74
End: 2024-09-25
Payer: MEDICARE

## 2024-09-25 ENCOUNTER — TELEPHONE (OUTPATIENT)
Dept: CARDIOLOGY | Facility: CLINIC | Age: 74
End: 2024-09-25
Payer: MEDICARE

## 2024-09-25 VITALS
BODY MASS INDEX: 25.44 KG/M2 | SYSTOLIC BLOOD PRESSURE: 120 MMHG | DIASTOLIC BLOOD PRESSURE: 74 MMHG | HEIGHT: 64 IN | WEIGHT: 149 LBS | HEART RATE: 82 BPM

## 2024-09-25 DIAGNOSIS — R07.9 CHEST PAIN, UNSPECIFIED TYPE: ICD-10-CM

## 2024-09-25 DIAGNOSIS — R94.31 NONSPECIFIC ABNORMAL ELECTROCARDIOGRAM (ECG) (EKG): Primary | ICD-10-CM

## 2024-09-25 DIAGNOSIS — Z82.49 FAMILY HISTORY OF HEART DISEASE: ICD-10-CM

## 2024-09-25 DIAGNOSIS — K55.1 MESENTERIC ARTERY STENOSIS: ICD-10-CM

## 2024-09-25 DIAGNOSIS — E78.5 HYPERLIPIDEMIA, UNSPECIFIED HYPERLIPIDEMIA TYPE: ICD-10-CM

## 2024-09-25 DIAGNOSIS — I77.1 STRICTURE OF ARTERY: ICD-10-CM

## 2024-09-25 DIAGNOSIS — J44.9 CHRONIC OBSTRUCTIVE PULMONARY DISEASE, UNSPECIFIED COPD TYPE: ICD-10-CM

## 2024-09-25 DIAGNOSIS — R79.9 ABNORMAL FINDING OF BLOOD CHEMISTRY, UNSPECIFIED: ICD-10-CM

## 2024-09-25 DIAGNOSIS — Z13.6 ENCOUNTER FOR SCREENING FOR CARDIOVASCULAR DISORDERS: ICD-10-CM

## 2024-09-25 LAB
OHS QRS DURATION: 102 MS
OHS QTC CALCULATION: 445 MS

## 2024-09-25 PROCEDURE — 99999 PR PBB SHADOW E&M-EST. PATIENT-LVL IV: CPT | Mod: PBBFAC,,, | Performed by: STUDENT IN AN ORGANIZED HEALTH CARE EDUCATION/TRAINING PROGRAM

## 2024-09-25 PROCEDURE — 99214 OFFICE O/P EST MOD 30 MIN: CPT | Mod: PBBFAC,25,PN | Performed by: STUDENT IN AN ORGANIZED HEALTH CARE EDUCATION/TRAINING PROGRAM

## 2024-09-25 PROCEDURE — 99204 OFFICE O/P NEW MOD 45 MIN: CPT | Mod: S$PBB,,, | Performed by: STUDENT IN AN ORGANIZED HEALTH CARE EDUCATION/TRAINING PROGRAM

## 2024-09-25 PROCEDURE — 93010 ELECTROCARDIOGRAM REPORT: CPT | Mod: S$PBB,,, | Performed by: INTERNAL MEDICINE

## 2024-09-25 PROCEDURE — 93005 ELECTROCARDIOGRAM TRACING: CPT | Mod: PBBFAC,PN | Performed by: INTERNAL MEDICINE

## 2024-09-25 RX ORDER — LISINOPRIL 20 MG/1
20 TABLET ORAL DAILY
COMMUNITY

## 2024-09-30 RX ORDER — METOPROLOL TARTRATE 50 MG/1
100 TABLET ORAL
COMMUNITY
End: 2024-09-30 | Stop reason: SDUPTHER

## 2024-10-01 ENCOUNTER — TELEPHONE (OUTPATIENT)
Dept: CARDIOLOGY | Facility: CLINIC | Age: 74
End: 2024-10-01
Payer: MEDICARE

## 2024-10-01 NOTE — TELEPHONE ENCOUNTER
Spoke with patient.  She has printed orders for [facility] and will have them drawn tomorrow.    ----- Message from Dustin Newberry MD sent at 10/1/2024  1:11 PM CDT -----  Regarding: RE: cCTA needing updated creatinine  Iwona Escalona, I ordered labs for her 9/25/24  ----- Message -----  From: Pola Lunsford RN  Sent: 10/1/2024   8:26 AM CDT  To: Dustin Newberry MD  Subject: cCTA needing updated creatinine                  Dr. Larios,     This patient appears to need a new creatinine in advance of his upcoming cCTA. [facility] has recently started notifying Dr. Em that this step has been missed with us screening the charts. I am just the messenger deferring to ordering MDs. This patient does meet the criteria per the protocol to have a creatinine level drawn within 30 days of their appointment.    I do see that the labs are ordered if someone from your staff can notify the patient of the need for this to be done in advance of their test and instruct them on where to have this done, that would be greatly appreciated.     Thanks,      --Pola

## 2024-10-02 ENCOUNTER — PATIENT MESSAGE (OUTPATIENT)
Dept: CARDIOLOGY | Facility: HOSPITAL | Age: 74
End: 2024-10-02
Payer: MEDICARE

## 2024-10-02 ENCOUNTER — TELEPHONE (OUTPATIENT)
Dept: CARDIOLOGY | Facility: HOSPITAL | Age: 74
End: 2024-10-02
Payer: MEDICARE

## 2024-10-02 RX ORDER — METOPROLOL TARTRATE 50 MG/1
100 TABLET ORAL
Qty: 2 TABLET | Refills: 0 | Status: SHIPPED | OUTPATIENT
Start: 2024-10-02

## 2024-10-02 NOTE — TELEPHONE ENCOUNTER
I had the pleasure of discussing your upcoming Cardiac CTA scheduled for 10/04/2024 at 9:00. To review, we discussed showing up 15-20 minutes before your appointment time. Your test is located at [facility] on the corner of [address] and [address], address [address], [place] [address], next door to [employer].     I have reviewed your current medications that you take and I've discussed the Cardiac CTA prep for heart rate management with Dr. Em, the interpreting MD. He has ordered for you to take 100mg of Metoprolol tartrate (Lopressor) 2-hours prior to your CTA. Again, the goal for this is to maintain a desired heart rate of ~60 beats/minute for the duration of the test--about 40 minutes. You endorsed taking Amlodipine (Norvasc) at night, which is fine to continue in advance of your test. However, please ensure to HOLD and BRING your morning dose of Lisinopril (Prinivil, Zestril) with you to the CTA in the event this is needed upon completion of the test.    You endorsed having your lab work scheduled for this morning via [facility] and we discussed why this was ordered--to ensure your renal function is stable enough to received the IV contrast needed to complete the imaging associated with this test.    Reminder for a 4-hour fasting time, no caffeine the AM of, and you can have water, anywhere from 16-32 ounces before and after completion of the test. It is advisable to have someone transport you to and from the test as a safety precaution. Thank you again for your time today. For any questions or concerns: I am available M-F from 7:30-4, please call [phone].

## 2024-10-03 LAB
BUN SERPL-MCNC: 13 MG/DL (ref 7–25)
BUN/CREAT SERPL: NORMAL (CALC) (ref 6–22)
CALCIUM SERPL-MCNC: 9.4 MG/DL (ref 8.6–10.4)
CHLORIDE SERPL-SCNC: 103 MMOL/L (ref 98–110)
CHOLEST SERPL-MCNC: 177 MG/DL
CHOLEST/HDLC SERPL: 2.6 (CALC)
CO2 SERPL-SCNC: 23 MMOL/L (ref 20–32)
CREAT SERPL-MCNC: 0.67 MG/DL (ref 0.6–1)
EGFR: 93 ML/MIN/1.73M2
GLUCOSE SERPL-MCNC: 97 MG/DL (ref 65–99)
HBA1C MFR BLD: 5.3 % OF TOTAL HGB
HDLC SERPL-MCNC: 69 MG/DL
LDLC SERPL CALC-MCNC: 94 MG/DL (CALC)
NONHDLC SERPL-MCNC: 108 MG/DL (CALC)
POTASSIUM SERPL-SCNC: 3.7 MMOL/L (ref 3.5–5.3)
SODIUM SERPL-SCNC: 138 MMOL/L (ref 135–146)
TRIGL SERPL-MCNC: 46 MG/DL

## 2024-10-04 ENCOUNTER — HOSPITAL ENCOUNTER (OUTPATIENT)
Dept: RADIOLOGY | Facility: HOSPITAL | Age: 74
Discharge: HOME | End: 2024-10-04
Attending: STUDENT IN AN ORGANIZED HEALTH CARE EDUCATION/TRAINING PROGRAM
Payer: MEDICARE

## 2024-10-04 VITALS — OXYGEN SATURATION: 100 % | DIASTOLIC BLOOD PRESSURE: 59 MMHG | SYSTOLIC BLOOD PRESSURE: 116 MMHG | HEART RATE: 62 BPM

## 2024-10-04 DIAGNOSIS — Z13.6 ENCOUNTER FOR SCREENING FOR CARDIOVASCULAR DISORDERS: ICD-10-CM

## 2024-10-04 DIAGNOSIS — K55.1 MESENTERIC ARTERY STENOSIS: Primary | ICD-10-CM

## 2024-10-04 DIAGNOSIS — R07.9 CHEST PAIN, UNSPECIFIED TYPE: ICD-10-CM

## 2024-10-04 PROCEDURE — 25000242 PHARM REV CODE 250 ALT 637 W/ HCPCS: Performed by: STUDENT IN AN ORGANIZED HEALTH CARE EDUCATION/TRAINING PROGRAM

## 2024-10-04 PROCEDURE — 25500020 PHARM REV CODE 255: Performed by: STUDENT IN AN ORGANIZED HEALTH CARE EDUCATION/TRAINING PROGRAM

## 2024-10-04 PROCEDURE — 75574 CT ANGIO HRT W/3D IMAGE: CPT | Mod: TC

## 2024-10-04 PROCEDURE — 75574 CT ANGIO HRT W/3D IMAGE: CPT | Mod: 26,,, | Performed by: RADIOLOGY

## 2024-10-04 RX ORDER — NITROGLYCERIN 0.4 MG/1
0.4 TABLET SUBLINGUAL ONCE
Status: COMPLETED | OUTPATIENT
Start: 2024-10-04 | End: 2024-10-04

## 2024-10-04 RX ORDER — METOPROLOL TARTRATE 1 MG/ML
5 INJECTION, SOLUTION INTRAVENOUS EVERY 5 MIN PRN
Status: DISCONTINUED | OUTPATIENT
Start: 2024-10-04 | End: 2024-10-05 | Stop reason: HOSPADM

## 2024-10-04 RX ADMIN — IOHEXOL 100 ML: 350 INJECTION, SOLUTION INTRAVENOUS at 08:10

## 2024-10-04 RX ADMIN — NITROGLYCERIN 0.4 MG: 0.4 TABLET SUBLINGUAL at 08:10

## 2024-10-04 NOTE — NURSING
Pt arrived to [facility] for cardiac CTA.  Pt AAOx4, ambulatory, no distress noted. Pt informed of procedure, need for PIV and side effects of contrast and nitroglycerin.  Pt connected for continuous vital sign monitoring. VS assessed and put in flowsheets.  IV in place.  Pt verbalizes understanding.

## 2024-10-04 NOTE — NURSING
Pt VSS post CTA study. Pt exhibiting no adverse effects from medication. Pt able to adequately ambulate with no dizziness or SOB. IV removed, site dressed with guaze and coban. [facility] and has ride home. Pt educated on post study instructions. Verbalized understanding.

## 2024-10-09 ENCOUNTER — HOSPITAL ENCOUNTER (OUTPATIENT)
Dept: CARDIOLOGY | Facility: HOSPITAL | Age: 74
Discharge: HOME | End: 2024-10-09
Attending: STUDENT IN AN ORGANIZED HEALTH CARE EDUCATION/TRAINING PROGRAM
Payer: MEDICARE

## 2024-10-09 VITALS — BODY MASS INDEX: 25.44 KG/M2 | WEIGHT: 149 LBS | HEIGHT: 64 IN

## 2024-10-09 DIAGNOSIS — K55.1 MESENTERIC ARTERY STENOSIS: ICD-10-CM

## 2024-10-09 DIAGNOSIS — Z82.49 FAMILY HISTORY OF HEART DISEASE: ICD-10-CM

## 2024-10-09 DIAGNOSIS — I77.1 STRICTURE OF ARTERY: ICD-10-CM

## 2024-10-09 DIAGNOSIS — R79.9 ABNORMAL FINDING OF BLOOD CHEMISTRY, UNSPECIFIED: ICD-10-CM

## 2024-10-09 DIAGNOSIS — R94.31 NONSPECIFIC ABNORMAL ELECTROCARDIOGRAM (ECG) (EKG): ICD-10-CM

## 2024-10-09 DIAGNOSIS — E78.5 HYPERLIPIDEMIA, UNSPECIFIED HYPERLIPIDEMIA TYPE: ICD-10-CM

## 2024-10-09 LAB
APICAL FOUR CHAMBER EJECTION FRACTION: 66 %
APICAL TWO CHAMBER EJECTION FRACTION: 52 %
ASCENDING AORTA: 2.91 CM
AV INDEX (PROSTH): 0.59
AV MEAN GRADIENT: 7.6 MMHG
AV PEAK GRADIENT: 13 MMHG
AV VALVE AREA BY VELOCITY RATIO: 2.3 CM²
AV VALVE AREA: 2.2 CM²
AV VELOCITY RATIO: 0.61
BSA FOR ECHO PROCEDURE: 1.75 M2
CV ECHO LV RWT: 0.43 CM
DOP CALC AO PEAK VEL: 1.8 M/S
DOP CALC AO VTI: 40.9 CM
DOP CALC LVOT AREA: 3.8 CM2
DOP CALC LVOT DIAMETER: 2.2 CM
DOP CALC LVOT PEAK VEL: 1.1 M/S
DOP CALC LVOT STROKE VOLUME: 91.9 CM3
DOP CALCLVOT PEAK VEL VTI: 24.2 CM
E WAVE DECELERATION TIME: 151.47 MSEC
E/A RATIO: 0.98
E/E' RATIO: 6.5 M/S
ECHO LV POSTERIOR WALL: 0.9 CM (ref 0.6–1.1)
FRACTIONAL SHORTENING: 28.6 % (ref 28–44)
INTERVENTRICULAR SEPTUM: 1 CM (ref 0.6–1.1)
IVRT: 68.51 MSEC
LA MAJOR: 5.61 CM
LA MINOR: 5.57 CM
LA WIDTH: 4.12 CM
LEFT ATRIUM AREA SYSTOLIC (APICAL 2 CHAMBER): 16.03 CM2
LEFT ATRIUM AREA SYSTOLIC (APICAL 4 CHAMBER): 17.59 CM2
LEFT ATRIUM SIZE: 2.95 CM
LEFT ATRIUM VOLUME INDEX MOD: 23.3 ML/M2
LEFT ATRIUM VOLUME INDEX: 33.4 ML/M2
LEFT ATRIUM VOLUME MOD: 40.24 ML
LEFT ATRIUM VOLUME: 57.75 CM3
LEFT CBA DIAS: 11 CM/S
LEFT CBA SYS: 66 CM/S
LEFT CCA DIST DIAS: 14 CM/S
LEFT CCA DIST SYS: 84 CM/S
LEFT CCA PROX DIAS: 12 CM/S
LEFT CCA PROX SYS: 86 CM/S
LEFT ECA DIAS: 9 CM/S
LEFT ECA SYS: 87 CM/S
LEFT ICA DIST DIAS: 18 CM/S
LEFT ICA DIST SYS: 64 CM/S
LEFT ICA MID DIAS: 26 CM/S
LEFT ICA MID SYS: 89 CM/S
LEFT ICA PROX DIAS: 14 CM/S
LEFT ICA PROX SYS: 56 CM/S
LEFT INTERNAL DIMENSION IN SYSTOLE: 3 CM (ref 2.1–4)
LEFT VENTRICLE DIASTOLIC VOLUME INDEX: 45.79 ML/M2
LEFT VENTRICLE DIASTOLIC VOLUME: 79.21 ML
LEFT VENTRICLE END DIASTOLIC VOLUME APICAL 2 CHAMBER: 62.99 ML
LEFT VENTRICLE END DIASTOLIC VOLUME APICAL 4 CHAMBER: 83.34 ML
LEFT VENTRICLE END SYSTOLIC VOLUME APICAL 2 CHAMBER: 36.9 ML
LEFT VENTRICLE END SYSTOLIC VOLUME APICAL 4 CHAMBER: 41.73 ML
LEFT VENTRICLE MASS INDEX: 73.9 G/M2
LEFT VENTRICLE SYSTOLIC VOLUME INDEX: 19.4 ML/M2
LEFT VENTRICLE SYSTOLIC VOLUME: 33.48 ML
LEFT VENTRICULAR INTERNAL DIMENSION IN DIASTOLE: 4.2 CM (ref 3.5–6)
LEFT VENTRICULAR MASS: 127.8 G
LEFT VERTEBRAL DIAS: 7 CM/S
LEFT VERTEBRAL SYS: 37 CM/S
LV LATERAL E/E' RATIO: 6.5 M/S
LV SEPTAL E/E' RATIO: 6.5 M/S
LVED V (TEICH): 79.21 ML
LVES V (TEICH): 33.48 ML
LVOT MG: 3.16 MMHG
LVOT MV: 0.86 CM/S
MV PEAK A VEL: 0.93 M/S
MV PEAK E VEL: 0.91 M/S
OHS CV CAROTID RIGHT ICA EDV HIGHEST: 27
OHS CV CAROTID ULTRASOUND LEFT ICA/CCA RATIO: 1.06
OHS CV CAROTID ULTRASOUND RIGHT ICA/CCA RATIO: 1.25
OHS CV PV CAROTID LEFT HIGHEST CCA: 86
OHS CV PV CAROTID LEFT HIGHEST ICA: 89
OHS CV PV CAROTID RIGHT HIGHEST CCA: 87
OHS CV PV CAROTID RIGHT HIGHEST ICA: 105
OHS CV RV/LV RATIO: 0.74 CM
OHS CV US CAROTID LEFT HIGHEST EDV: 26
OHS LV EJECTION FRACTION SIMPSONS BIPLANE MOD: 60 %
PISA TR MAX VEL: 2.26 M/S
RA MAJOR: 4.95 CM
RA PRESSURE ESTIMATED: 3 MMHG
RA WIDTH: 3.17 CM
RIGHT CBA DIAS: 12 CM/S
RIGHT CBA SYS: 70 CM/S
RIGHT CCA DIST DIAS: 17 CM/S
RIGHT CCA DIST SYS: 84 CM/S
RIGHT CCA PROX SYS: 87 CM/S
RIGHT ECA DIAS: 10 CM/S
RIGHT ECA SYS: 88 CM/S
RIGHT ICA DIST DIAS: 27 CM/S
RIGHT ICA DIST SYS: 105 CM/S
RIGHT ICA MID DIAS: 18 CM/S
RIGHT ICA MID SYS: 72 CM/S
RIGHT ICA PROX DIAS: 15 CM/S
RIGHT ICA PROX SYS: 76 CM/S
RIGHT VENTRICLE DIASTOLIC BASEL DIMENSION: 3.1 CM
RIGHT VENTRICULAR END-DIASTOLIC DIMENSION: 3.07 CM
RIGHT VERTEBRAL DIAS: 12 CM/S
RIGHT VERTEBRAL SYS: 50 CM/S
RV TB RVSP: 5 MMHG
RV TISSUE DOPPLER FREE WALL SYSTOLIC VELOCITY 1 (APICAL 4 CHAMBER VIEW): 17.16 CM/S
SINUS: 3.17 CM
STJ: 2.82 CM
TDI LATERAL: 0.14 M/S
TDI SEPTAL: 0.14 M/S
TDI: 0.14 M/S
TR MAX PG: 20 MMHG
TRICUSPID ANNULAR PLANE SYSTOLIC EXCURSION: 2.97 CM
TV REST PULMONARY ARTERY PRESSURE: 23 MMHG
Z-SCORE OF LEFT VENTRICULAR DIMENSION IN END DIASTOLE: -1.32
Z-SCORE OF LEFT VENTRICULAR DIMENSION IN END SYSTOLE: 0.09

## 2024-10-09 PROCEDURE — 93306 TTE W/DOPPLER COMPLETE: CPT | Mod: 26,,, | Performed by: INTERNAL MEDICINE

## 2024-10-09 PROCEDURE — 93306 TTE W/DOPPLER COMPLETE: CPT

## 2024-10-09 PROCEDURE — 93880 EXTRACRANIAL BILAT STUDY: CPT

## 2024-10-09 PROCEDURE — 93880 EXTRACRANIAL BILAT STUDY: CPT | Mod: 26,,, | Performed by: INTERNAL MEDICINE

## 2025-01-25 NOTE — PROGRESS NOTES
Cardiology Clinic Visit    History of Present Illness:       Cornelia Orlando is a pleasant 74 y.o. female with PMH of mesenteric artery stenosis, COPD, abnormal ECG, HLDHTN and family hx of CV dz here for follow up. She is doing great and voiced no CV complaints. Tolerating meds without side effects. Will repeat blood work with her PCP and if LDL >100 will start statin. Denies cp, sob, palpitations, presyncope/dizziness, syncope, orthopnea, PND, bendopnea, decrease ET, NVDC, fever, chills.      CV US bilateral 10/9/24    There is 0-19% right Internal Carotid Stenosis.    There is 0-19% left Internal Carotid Stenosis.    Antegrade vertebral flow bilaterally    Mild atherosclerotic plaques    Echo  10/9/24   Left Ventricle: The left ventricle is normal in size. Normal wall thickness. There is concentric remodeling. There is normal systolic function. Biplane (2D) method of discs ejection fraction is 60%. There is normal diastolic function.    Right Ventricle: Normal right ventricular cavity size. Wall thickness is normal. Systolic function is normal.    Pulmonary Artery: The estimated pulmonary artery systolic pressure is 23 mmHg.    IVC/SVC: Normal venous pressure at 3 mmHg.       REPORT  CAD RADS 0  Interpretation:     *Absence of CAD  Recommendations:     recently had an MI/Vfib arrest Mr. Donaldo Orlando    History obtained by patient interview and supplemented by nursing documentation and chart review.   PMHx:  has a past medical history of Asthma and Fibrocystic breast.   SurgHx:  has a past surgical history that includes Hysterectomy and Oophorectomy.   FamHx: family history includes Heart disease in her father.   SocialHx:  reports that she quit smoking about 19 years ago. Her smoking use included cigarettes. She has never used smokeless tobacco. She reports current alcohol use.  Home Meds:  Current Outpatient Medications   Medication Instructions    amLODIPine (NORVASC) 5 MG tablet TAKE 1 TABLET BY MOUTH  "EVERY DAY    clonazePAM (KLONOPIN) 2 MG Tab TAKE 1 TABLET BY MOUTH EVERY DAY    estrogens,conjugated,-methyltestosterone 0.625-1.25mg (ESTRATEST HS) 0.625-1.25 mg per tablet TAKE 1 TABLET BY MOUTH DAILY    ipratropium (ATROVENT) 42 mcg (0.06 %) nasal spray No dose, route, or frequency recorded.    levothyroxine (SYNTHROID) 50 MCG tablet TAKE 1 TABLET BY MOUTH EVERY DAY    lisinopriL (PRINIVIL,ZESTRIL) 20 mg, Daily    metoprolol tartrate (LOPRESSOR) 100 mg, Oral, On Call Procedure    montelukast (SINGULAIR) 10 mg, Oral, Nightly, 1 Tablet Oral Every day    tiZANidine (ZANAFLEX) 4 mg, Oral, Daily PRN    VENTOLIN HFA 90 mcg/actuation inhaler 1 Inhaler, 2 times daily PRN       Review of Systems: Comprehensive ROS was performed and is negative unless otherwise noted in HPI.   Objective   Objective/Exam:   /71 (BP Location: Left arm, Patient Position: Sitting)   Pulse 76   Ht 5' 4" (1.626 m)   Wt 75.3 kg (166 lb 0.1 oz)   SpO2 97%   BMI 28.49 kg/m²    Wt Readings from Last 4 Encounters:   01/28/25 75.3 kg (166 lb 0.1 oz)   10/09/24 67.6 kg (149 lb)   09/25/24 67.6 kg (149 lb)   07/16/18 71.4 kg (157 lb 6.5 oz)      Constitutional: NAD, Atraumatic, Conversant   HEENT: MMM, Sclera anicteric, No JVD   Cardiovascular: RRR, no murmurs noted, no chest tenderness to palpation, 2+ radial pulses b/l  Pulmonary: normal respiratory effort, CTAB, no crackles, wheezes  Abdominal: S/NT/ND  Musculoskeletal: No lower extremity edema noted b/l  Neurological: No gross neurological deficits  Skin: W/D/I  Psych: Appropriate affect, normal mood  Labs/Imaging/Procedures   Personally reviewed  Lab Results   Component Value Date     10/02/2024    K 3.7 10/02/2024     10/02/2024    CO2 23 10/02/2024    BUN 13 10/02/2024    CREATININE 0.67 10/02/2024    ANIONGAP 7 (L) 06/28/2018     Lab Results   Component Value Date    HGBA1C 5.3 10/02/2024     No results found for: "BNP", "BNPTRIAGEBLO"   Lab Results   Component Value Date " "   WBC 6.64 06/28/2018    HGB 13.9 06/28/2018    HCT 42.3 06/28/2018     06/28/2018    GRAN 3.9 06/28/2018    GRAN 57.9 06/28/2018     Lab Results   Component Value Date    CHOL 177 10/02/2024    HDL 69 10/02/2024    LDLCALC 94 10/02/2024    LDLCALC 137.0 06/28/2018    LDLCALC 125.2 07/11/2017    TRIG 46 10/02/2024     Lab Results   Component Value Date    TSH 1.980 06/28/2018     No results found for: "APOLIPOPROTE"  No results found for: "LIPOA"     TTE:  No results found for this or any previous visit.    Stress Testing:   No results found for this or any previous visit.     Coronary Angiogram:  No results found for this or any previous visit.    -Reviewing Medical records & lab results  -Independently reviewing and interpreting (if not documented by myself) EKGs, echocardiograms, catherizations   -Obtaining a history, performing a relevant exam, counseling/educating the patient/family  -Documenting clinical information in the EMR including ordering of tests  -Care coordination and communicating with other health care providers       Problem List:     1. Mesenteric artery stenosis    2. Hyperlipidemia, unspecified hyperlipidemia type    3. Family history of heart disease    4. Abnormal finding of blood chemistry, unspecified        Assessment/Plan:     Cornelia Orlando is a pleasant 73 y.o. female with PMH of mesenteric artery stenosis, COPD, abnormal ECG, HLDHTN and family hx of CV dz noted below in assessment/plan presents here to establish care. had a Vfib arrest s/p PCI with a complex course. Given pmhx and fhx she is being proactive with her health. She has noticed occasional PAREDES which worries and given the circumstances she wants to screen for CV diseases.    Plan:  Continue amlodipine  Continue lisinopril   Blood work prior to next visit  RTC 6 months         Patient expressed verbal understanding and agreed with the plan     Return sooner for concerns or questions. If symptoms persist go to the " ED.  I have reviewed all pertinent data including patient's medical history in detail and updated the computerized patient record.     Total time spent counseling greater than fifty percent of total visit time.  Counseling included discussion regarding imaging findings, diagnosis, possibilities, treatment options, risks and benefits.      Thank you for the opportunity to care for this patient. Please don't hesitate to reach out with any questions/concerns        Dustin Larios MD  Cardiovascular Disease; Interventional Cardiology  [facility]

## 2025-01-28 ENCOUNTER — OFFICE VISIT (OUTPATIENT)
Dept: CARDIOLOGY | Facility: CLINIC | Age: 75
End: 2025-01-28
Payer: MEDICARE

## 2025-01-28 VITALS
BODY MASS INDEX: 28.34 KG/M2 | SYSTOLIC BLOOD PRESSURE: 113 MMHG | HEIGHT: 64 IN | WEIGHT: 166 LBS | DIASTOLIC BLOOD PRESSURE: 71 MMHG | HEART RATE: 76 BPM | OXYGEN SATURATION: 97 %

## 2025-01-28 DIAGNOSIS — E78.5 HYPERLIPIDEMIA, UNSPECIFIED HYPERLIPIDEMIA TYPE: ICD-10-CM

## 2025-01-28 DIAGNOSIS — Z82.49 FAMILY HISTORY OF HEART DISEASE: ICD-10-CM

## 2025-01-28 DIAGNOSIS — R79.9 ABNORMAL FINDING OF BLOOD CHEMISTRY, UNSPECIFIED: ICD-10-CM

## 2025-01-28 DIAGNOSIS — K55.1 MESENTERIC ARTERY STENOSIS: Primary | ICD-10-CM

## 2025-01-28 PROCEDURE — 99999 PR PBB SHADOW E&M-EST. PATIENT-LVL III: CPT | Mod: PBBFAC,,, | Performed by: STUDENT IN AN ORGANIZED HEALTH CARE EDUCATION/TRAINING PROGRAM

## 2025-01-28 PROCEDURE — 99213 OFFICE O/P EST LOW 20 MIN: CPT | Mod: PBBFAC,PN | Performed by: STUDENT IN AN ORGANIZED HEALTH CARE EDUCATION/TRAINING PROGRAM

## 2025-01-28 PROCEDURE — 99215 OFFICE O/P EST HI 40 MIN: CPT | Mod: S$PBB,,, | Performed by: STUDENT IN AN ORGANIZED HEALTH CARE EDUCATION/TRAINING PROGRAM
